# Patient Record
Sex: FEMALE | Race: WHITE
[De-identification: names, ages, dates, MRNs, and addresses within clinical notes are randomized per-mention and may not be internally consistent; named-entity substitution may affect disease eponyms.]

---

## 2020-09-11 ENCOUNTER — HOSPITAL ENCOUNTER (OUTPATIENT)
Dept: HOSPITAL 84 - D.ER | Age: 55
Setting detail: OBSERVATION
LOS: 1 days | Discharge: HOME | End: 2020-09-12
Attending: FAMILY MEDICINE | Admitting: FAMILY MEDICINE
Payer: COMMERCIAL

## 2020-09-11 VITALS — DIASTOLIC BLOOD PRESSURE: 90 MMHG | SYSTOLIC BLOOD PRESSURE: 129 MMHG

## 2020-09-11 VITALS — WEIGHT: 128.27 LBS | BODY MASS INDEX: 25.86 KG/M2 | HEIGHT: 59 IN

## 2020-09-11 VITALS — SYSTOLIC BLOOD PRESSURE: 136 MMHG | DIASTOLIC BLOOD PRESSURE: 68 MMHG

## 2020-09-11 VITALS — SYSTOLIC BLOOD PRESSURE: 139 MMHG | DIASTOLIC BLOOD PRESSURE: 77 MMHG

## 2020-09-11 VITALS — SYSTOLIC BLOOD PRESSURE: 145 MMHG | DIASTOLIC BLOOD PRESSURE: 80 MMHG

## 2020-09-11 VITALS — SYSTOLIC BLOOD PRESSURE: 129 MMHG | DIASTOLIC BLOOD PRESSURE: 90 MMHG

## 2020-09-11 DIAGNOSIS — F17.203: ICD-10-CM

## 2020-09-11 DIAGNOSIS — N39.0: ICD-10-CM

## 2020-09-11 DIAGNOSIS — E78.5: ICD-10-CM

## 2020-09-11 DIAGNOSIS — I10: ICD-10-CM

## 2020-09-11 DIAGNOSIS — G25.81: ICD-10-CM

## 2020-09-11 DIAGNOSIS — I20.0: Primary | ICD-10-CM

## 2020-09-11 LAB
ALBUMIN SERPL-MCNC: 3.7 G/DL (ref 3.4–5)
ALP SERPL-CCNC: 119 U/L (ref 30–120)
ALT SERPL-CCNC: 30 U/L (ref 10–68)
ANION GAP SERPL CALC-SCNC: 9.7 MMOL/L (ref 8–16)
APTT BLD: 28.6 SECONDS (ref 22.8–39.4)
BACTERIA #/AREA URNS HPF: (no result) /HPF
BASOPHILS NFR BLD AUTO: 0.3 % (ref 0–2)
BILIRUB SERPL-MCNC: 0.22 MG/DL (ref 0.2–1.3)
BILIRUB SERPL-MCNC: NEGATIVE MG/DL
BUN SERPL-MCNC: 11 MG/DL (ref 7–18)
CALCIUM SERPL-MCNC: 9.1 MG/DL (ref 8.5–10.1)
CHLORIDE SERPL-SCNC: 106 MMOL/L (ref 98–107)
CK MB SERPL-MCNC: 1.8 U/L (ref 0–3.6)
CK SERPL-CCNC: 85 UL (ref 21–215)
CO2 SERPL-SCNC: 29.1 MMOL/L (ref 21–32)
CREAT SERPL-MCNC: 0.8 MG/DL (ref 0.6–1.3)
EOSINOPHIL NFR BLD: 1.7 % (ref 0–7)
ERYTHROCYTE [DISTWIDTH] IN BLOOD BY AUTOMATED COUNT: 14 % (ref 11.5–14.5)
GLOBULIN SER-MCNC: 3 G/L
GLUCOSE SERPL-MCNC: 103 MG/DL (ref 74–106)
HCT VFR BLD CALC: 39.8 % (ref 36–48)
HGB BLD-MCNC: 13.2 G/DL (ref 12–16)
IMM GRANULOCYTES NFR BLD: 0.2 % (ref 0–5)
INR PPP: 0.87 (ref 0.85–1.17)
KETONES UR STRIP-MCNC: NEGATIVE MG/DL
LYMPHOCYTES NFR BLD AUTO: 39.8 % (ref 15–50)
MAGNESIUM SERPL-MCNC: 2 MG/DL (ref 1.8–2.4)
MCH RBC QN AUTO: 30.6 PG (ref 26–34)
MCHC RBC AUTO-ENTMCNC: 33.2 G/DL (ref 31–37)
MCV RBC: 92.3 FL (ref 80–100)
MONOCYTES NFR BLD: 5.6 % (ref 2–11)
NEUTROPHILS NFR BLD AUTO: 52.4 % (ref 40–80)
NITRITE UR-MCNC: POSITIVE MG/ML
OSMOLALITY SERPL CALC.SUM OF ELEC: 279 MOSM/KG (ref 275–300)
PH UR STRIP: 5 [PH] (ref 5–8)
PLATELET # BLD: 224 10X3/UL (ref 130–400)
PMV BLD AUTO: 10.2 FL (ref 7.4–10.4)
POTASSIUM SERPL-SCNC: 3.8 MMOL/L (ref 3.5–5.1)
PROT SERPL-MCNC: 6.7 G/DL (ref 6.4–8.2)
PROTHROMBIN TIME: 11.8 SECONDS (ref 11.6–15)
RBC # BLD AUTO: 4.31 10X6/UL (ref 4–5.4)
SODIUM SERPL-SCNC: 141 MMOL/L (ref 136–145)
SQUAMOUS #/AREA URNS HPF: (no result) /HPF (ref 0–5)
TROPONIN I SERPL-MCNC: < 0.017 NG/ML (ref 0–0.06)
UROBILINOGEN UR-MCNC: NORMAL MG/DL (ref ?–2)
WBC # BLD AUTO: 9.6 10X3/UL (ref 4.8–10.8)
WBC #/AREA URNS HPF: (no result) /HPF (ref 0–4)

## 2020-09-11 NOTE — CN
PATIENT NAME:BAKARI ALLRED                                 MEDICAL RECORD: [id number]
: 65                                              LOCATION:[facility] D.2115
ADMIT DATE: 20                                       ACCOUNT: [id number]
CONSULTING PHYSICIAN:    IZABELA BANEGAS MD          
                                               
REFERRING PHYSICIAN:     JAI NOBLES MD          
 
 
DATE OF CONSULTATION:  2020
 
HISTORY OF PRESENT ILLNESS:  A 55-year-old female with no known history of
coronary artery disease, history of hypertension, has history of dyslipidemia,
ongoing tobacco use, admitted with acute coronary syndrome, onset of episode
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
the jaw accompanied by left arm pain, strong family history of coronary artery
disease.  We are asked to see her concerning her cardiovascular status.
 
PAST MEDICAL HISTORY:  Includes;
1.  History of dyslipidemia.
2.  Peripheral neuropathy.
3.  Restless leg syndrome.
 
MEDICATIONS:  Include Flexeril 5 mg p.o. at bedtime, Lipitor 40 mg p.o. every
day, Neurontin 300 t.i.d., Klonopin 1 mg at bedtime, Requip 0.25 at bedtime,
Zoloft 100 mg p.o. day.
 
ALLERGIES:  SULFA, MORPHINE AND TRAMADOL.
 
SOCIAL HISTORY:  Works at a nursing home.  Smokes about a pack a day,
nondrinker.  Easily takes care of all her ADLs, still works full time.
 
REVIEW OF SYSTEMS:  The patient reports easy bruising but reports no swollen
glands. The patient reports no fever, no night sweats, no significant weight
gain, no significant weight loss.  No significant exercise tolerance.  The
patient reports no dry eyes, no irritation, no vision change.  Patient reports
no difficulty hearing and no ear pain.  Patient reports no frequent nose bleeds
or nose and sinus problems.  Patient reports on arm pain on exertion.  No
shortness of breath while lying down.  No history of heart murmur.  Patient
reports no cough, no wheezing or coughing up blood.  Patient reports no
abdominal pain, no vomiting.  Normal appetite.  No diarrhea and not vomiting
blood.  No nausea and no constipation.  Patient reports no incontinence.  No
difficulty urinating.  No hematuria.  No increased frequency.  Patient reports
no muscle aches.  No weakness, no arthralgias, no back pain.  No swelling of the
extremities.  Patient reports no abnormal mole, no jaundice, no rashes.  Reports
no loss of consciousness.  No weakness and no numbness.  No seizures, dizziness,
or headaches.  The patient reports no depression, no sleep disturbance, feeling
safe in a relationship and no alcohol abuse.  Patient reports on fatigue. 
Reports no runny nose or sinus pressure.  No itching, no hives, and no frequent
sneezing.
 
PHYSICAL EXAMINATION:
GENERAL:  No acute distress, appears stated age.
VITAL SIGNS:  Blood pressure 112/70, pulse 66 and regular.
HEENT:  Normocephalic, atraumatic.
NECK:  No bruits are noted.
HEART:  Regular.  A II/VI systolic ejection murmur.
LUNGS:  Slightly prolonged expiratory phase, few expiratory wheezes.
ABDOMEN:  Soft, nontender.
 
 
 
CONSULT REPORT                                 [id number]    BAKARI ALLRED               
 
 
EXTREMITIES:  Pulses 2+.  No edema.
 
IMPRESSION:  Acute coronary syndrome, multiple risk factors, rest symptomology.
 
PLAN:  For angiography, intervention based on the above.
 
TRANSINT:[id number] Voice Confirmation ID: [id number] DOCUMENT ID: [id number]
                                           
                                           IZABELA BANEGAS MD          
 
 
 
 
 
 
 
 
 
 
 
 
 
 
 
 
 
 
 
 
 
 
 
 
 
 
 
 
 
 
 
 
 
 
 
 
 
 
CC:                                                             [id number]
DICTATION DATE: 20 1027     :     20 1531      DIS IN  
                                                                      20
[place] [id number] [place] [id number]

## 2020-09-11 NOTE — HEMODYNAMI
PATIENT:BAKARI ALLRED                                    MEDICAL RECORD: [id number]
: 65                                            LOCATION:[facility]     D.2115
ACCT# [id number]                                       ADMISSION DATE: 20
 
 
 Generatedon:202010:55
Patient name: BAKARI ALLRED Patient #: [id number] Visit #: [id number] SSN: 
: 1965
Date of study: 2020
Page: Of
Hemodynamic Procedure Report
****************************
Patient Data
Patient Demographics
Procedure consent was obtained
First Name: BAKARI           Gender: Female
Last Name: BRIGIDA          : 1965
Patient #: [id number]       Age: 55 year(s)
Visit #: [id number]       Race: 
Accession #:
[id number]
Additional ID: [id number]
Contact details
Address: [place] [address]        Phone: [phone]
State: [place]
City: [place]
Zip code: [address]
Past Medical History
Allergies
Allergen     Reaction     Date      Comments
Reported
Other                     2020 SULFA/MORPHINE/TRAMADOL
allergy
Admission
Admission Data
Admission Date: 2020   Admission Time: 13:09
Room #: D.2115
Height (in.): 59.06         BSA: 1.53 (m2)
Height (cm.): 150           BMI: 25.78 (kg/m2)
Weight (lbs.): 127.87
Weight (kg.): 58
Lab Results
Lab Result Date: 2020  Lab Result Time: 0:00
Biochemistry
Name         Units    Result                Min      Max
BUN          mg/dl    14       --(--*-)--   7        18
Creatinine   mg/dl    0.7      --(*---)--   0.6      1.3
eGFR         ml/min   90       --(*---)--   90       120
NONAFRICAN
Troponin l   ng/ml    0.017    --(-*--)--   0        0.06
CBC
Name         Units    Result                Min      Max
Hematocrit   %        40.2     -*(----)--   42       54
Hemoglobin   g/dl     13.3     -*(----)--   13.5     17.5
Procedure
Procedure Types
Cath Procedure
Diagnostic Procedure
LHC
 
[facility] w/Coronaries
Procedure Description
Procedure Date
Procedure Date: 2020
Procedure Start Time: 10:43
Procedure End Time: 10:53
Procedure Staff
Name                            Function
Vinay Nelson MD              Performing Physician
Trang Wren RT               Monitor
Miguel Alvarez RN                  Nurse
Yvrose Martin RT              Scrub
Procedure Data
Cath Procedure
Fluoroscopy
Diagnostic fluoroscopy      Total fluoroscopy Time: 0.8
time: 0.8 min               min
Diagnostic fluoroscopy      Total fluoroscopy dose: 219
dose: 219 mGy               mGy
Contrast Material
Contrast Material Type                       Amount (ml)
Isovue 300                                   43
Entry Location
Entry     Primary  Successful  Side  Size  Upsize Upsize Entry    Closure Succes
sful  Closure
Location                             (Fr)  1 (Fr) 2 (Fr) Remarks  Device        
      Remarks
Femoral                        Right 5 Fr                         Exoseal
artery
Estimated blood loss: 5 ml
Diagnostic catheters
Device Type               Used For           End Catheter
Placement
MULTIPACK JL 4.0 5Fr      Procedure
catheter
MULTIPACK 3DRC 5Fr        Procedure
catheter
MULTIPACK Pigtail 5 Fr    Procedure
catheter
Procedure Complications
No complications
Procedure Medications
Medication           Administration Route Dosage
Oxygen               etCO2 Nasal cannula  2 l/min
Lidocaine 2%         added to field       20
Heparin Flush Bag    added to field       2 bags
(1000units/500ml NS)
0.9% NaCl            I.V.                 100 ml/hr
Versed               I.V.                 2 mg
Fentanyl             I.V.                 50 mcg
Versed               I.V.                 2 mg
Fentanyl             I.V.                 50 mcg
Hemodynamics
Rest
BSA: 1.53 (m2) HGB: 13.3 (g/dl) O2 Consumption: Estimated: 137.12 (ml/min) O2 Co
nsumption
indexed: Estimated:89.62 (ml/min/m) Heart Rate: 52 (bpm)
Pressure Samples
Time     Site     Value (mmHg) Purpose      Heart      Use
Rate(bpm)
 
10:48    LV       96/11,7      Snapshot     61
Snapshots
Pre Cath      Intra         NCS           Post Cath
Vital Signs
Time     Heart  Resp   SPO2 etCO2   NIBP       Rhythm  Pain Status Sedation
Rate   (ipm)  (%)  (mmHg)  (mmHg)                         Level
(bpm)
10:35:36 51     15     98   39.7    122/64(86) NSR     4 (11) ,    10(A)
Distressing
10:39:48 55     14     97   38.2    105/55(74) NSR     4 (11) ,    10(A)
Distressing
10:43:50 61     11     96   29.9    106/66(81) NSR     4 (11) ,    10(A)
Distressing
10:47:56 57     11     96   45.6    106/58(85) NSR     0 (11) , No 9(A)
pain
10:51:59 56     12     96   44.1    103/62(76) NSR     0 (11) , No 10(A)
pain
Medications
Time     Medication       Route   Dose  Verified Delivered Reason     Notes  Eff
ectiveness
by       by
10:39:03 Oxygen           etCO2   2     Vinay  Buffie    used for
Nasal   l/min [facility] Kade Alvarez RN   procedure
cannula       MD
10:39:14 Lidocaine 2%     added   20ml  Vinay  Vinay   for local
to      vial  [facility]   anesthetic
field MD VILLAFUERTE
10:39:21 Heparin Flush    added   2     Vinay  Vinay   used for
Bag              to      bags  [facility]   procedure
(1000units/500ml field         MD       MD
NS)
10:39:29 0.9% NaCl        I.V.    100   Vinay  Buffie    Per
ml/hr [place]  Alvarez RN   physician
MD
10:41:30 Versed           I.V.    2 mg  Vinay  Buffie    for
Leslie  Bell RN   sedation
MD
10:41:35 Fentanyl         I.V.    50    Vinay  Buffie    for
mcg   Leslie  Alvarez RN   sedation
MD
10:46:10 Versed           I.V.    2 mg  Vinay  Buffie    for
[place]  Bell RN   sedation
MD
10:46:13 Fentanyl         I.V.    50    Vinay  Buffie    for
mcg   [facility] RN   sedation
MD
Procedure Log
Time     Note
10:04:54 Informed consent obtained and on chart
10:05:45 Procedure Status Urgent Heart Cath (IP).
10:05:48 Time tracking: Call back (After hours or weekends)
10:17:42 Trang Wren RT(R) sent for patient. Start room use.
10:18:47 Patient allergic to Other
allergySULFA/MORPHINE/TRAMADOL
10:19:02 H&P Date Dictated: 2020 Within 30 days and on
chart., ER History on chart..
10:20:14 Lab Result : eGFR NONAFRICAN 90 ml/min
10:20:14 Lab Result : Hemoglobin 13.3 g/dl
10:20:14 Lab Result : Troponin l 0.017 ng/ml
10:20:14 Lab Result : BUN 14 mg/dl
 
10:20:14 Lab Result : Creatinine 0.7 mg/dl
10:20:14 Lab Result : Hematocrit 40.2 %
10:20:21 Patient Height : 59.06 inches
10:20:26 Patient Weight : 127.87 lbs
10:22:15 Plan of Care:Hemodynamics will remain stable., Cardiac
rhythm will remain stable., Comfort level will be
maintained., Respiratory function will remain
adequate., Patient/ family verbilizes understanding of
procedure., Procedure tolerated without complication.,
Recovers from procedure without complications..
10:26:47 Patient received from Med II to CCL 1 Alert and
oriented. Tansferred to table in Supine position.
10::48 Warm blankets applied, and esteban hugger turned on for
patient comfort.
10::49 Correct patient and procedure confirmed by team.
10::50 ECG and BP/O2 sat monitors applied to patient.
10:34:28 Vital chart was started
10:34:40 Baseline sample Acquired.
10::44 Rhythm: sinus bradycardia
10::44 Full Disclosure recording started
10:34:45 Pre-procedure instructions explained to patient.
10:34:45 Pre-op teaching completed and patient verbalized
understanding.
10:34:47 Family unavailable.
10:34:48 Patient NPO since Midnight.
10:34:49 Is the patient allergic to Iodine/contrast media? No.
10:34:51 Is patient on blood thinner?No
10:34:53 Patient diabetic? No.
10:35:30 Previous problem with sedation/anesthesia? No ?
10:35:32 Snore? Yes
10:35:33 Sleep apnea? No
10:35:34 Deviated septum? No
10:35:35 Opens mouth fully? Yes
10:35:37 Sticks out tongue? Yes
10:35:39 Airway obstruction? No ?
10:35:41 Dentures? No ?
10:35:44 Pre procedure: right dorsailis pedis pulse 1+
Palpable, but thready & weak; easily obliterated
10:35:49 Patient pain scale 4/10 ?.
10:36:16 IV patent on arrival in right wrist with 0.9% NaCl at
O.
10:36:18 Lab results completed and on chart.
10:36:23 Right groin area was prepped with chlora-prep and
draped in sterile fashion
10:36:23 Alarms reviewed by R. N.
10:36:24 Sharps counted by scrub and verified by R.N.
10:39:03 Oxygen 2 l/min etCO2 Nasal cannula was administered by
Miguel Alvarez RN; used for procedure; Verbal order read
back and verified.
10:39:14 Lidocaine 2% 20ml vial added to field was administered
by Vinay Nelson MD; for local anesthetic; Verbal
order read back and verified.
10:39:21 Heparin Flush Bag (1000units/500ml NS) 2 bags added to
field was administered by Vinay Nelson MD; used for
procedure; Verbal order read back and verified.
10:39:24 Use device set Femoral Dx
10:39:25 ACIST Syringe (58631) opened to sterile field.
10:39:25 Bag Decanter (2002S) opened to sterile field.
10:39:26 ACIST Hand Control (70938) opened to sterile field.
10:39:27 ACIST Manifold (16837) opened to sterile field.
 
10:39:27 Tegaderm 4 x 4 (1626W) opened to sterile field.
10:39:28 Medline Cath Pack ([id number]) opened to sterile field.
10:39:29 0.9% NaCl 100 ml/hr I.V. was administered by Miguel Alvarez RN; Per physician; Verbal order read back and
verified.
10:39:30 DIAGNOSTIC Multipack 5Fr catheter set (CM2321) opened
to sterile field.
10:39:31 SHEATH 5FR [place] (EEX226) opened to sterile field.
10:39:32 EMERALD Guide Wire (299-267) opened to sterile field.
10:40:41 --------ALL STOP TIME OUT------
10:40:42 Final Timeout: patient, procedure, and site verified
with staff and physician. All members of the team are
in agreement.
10:40:43 Right groin site verified by team.
10:40:47 Fire Safety Assessment: A--An alcohol-based skin
anteseptic being used preoperatively., C--Open oxygen
or nitrous oxide is being used., D--An ESU, laser, or
fiber-optic light is being used.
10:40:49 Physical assessment completed. ASA score P 2 - A
patient with mild systemic disease as per Vinay Nelson MD.
10:40:51 1) 90+ Normal kidney functon but urine findings or
structural abnormalities or genetic trait point to
kidney disease.
10:40:53 Maximum allowable contrast dose (3.7 X eGFR X 0.75)250
ml.
10:40:56 Sedation plan: IV Moderate Sedation Medication:Versed,
Fentanyl
10:41:23 Zero performed for pressure channel P1
10:41:30 Versed 2 mg I.V. was administered by Miguel Alvarez RN;
for sedation; Verbal order read back and verified.
10:41:35 Fentanyl 50 mcg I.V. was administered by Miguel Alvarez
RN; for sedation; Verbal order read back and verified.
10:42:18 Procedure started.
10:43:13 Local anesthetic to right femoral artery with
Lidocaine 2% by Vinay Nelson MD.**INITIAL ACCESS
ONLY**
10:44:11 A 5 Fr sheath was inserted into the Right Femoral
artery
10:44:41 A MULTIPACK JL 4.0 5Fr catheter was advanced over the
wire and used for Procedure.
10:46:04 LCA angiography performed.
10:46:08 Catheter removed.
10:46:10 Versed 2 mg I.V. was administered by Miguel Alvarez RN;
for sedation; Verbal order read back and verified.
10:46:13 Fentanyl 50 mcg I.V. was administered by Miguel Alvarez
RN; for sedation; Verbal order read back and verified.
10:46:13 A MULTIPACK 3DRC 5Fr catheter was advanced over the
wire and used for Procedure.
10:47:13 RCA angiography performed.
10:47:14 Catheter removed.
10:47:20 A MULTIPACK Pigtail 5 Fr catheter was advanced over
the wire and used for Procedure.
10:48:21 LV gram done using TEE
10:48:23 Injector settings: Ml/sec: 10, Volume: 20,
10:48:54 LV hemodynamics recorded.
10:48:58 EF : 55 %
10:49:09 Catheter removed.
10:49:17 EXOSEAL 5Fr () opened to sterile field.
10:50:05 Sheath removed intact; hemostasis achieved with
 
Exoseal to the Right Femoral artery.
10:50:06 Procedure ended.(Physican Out)
10:50:16 Fluoroscopy time 00.80 minutes.
10:50:20 Fluoroscopy dose: 219 mGy
10:50:20 Flurop Dose total: 219
10:50:30 Dose Area Product 9800 mGy/cm.
10:50:34 Contrast amount:Isovue 300 43ml.
10:50:36 Maximum allowable dose exceeded? No.
10:50:37 Sharps counted by scrub and verified by R.N.
10:50:41 Post-op/insertion site Right Femoral artery dressed
using a 4 x 4 and Tegaderm.
10:50:44 Post-procedure physical assessment completed. ASA
score P 2 - A patient with mild systemic disease as
per Vinay Nelson MD.
10:50:48 Post procedure rhythm: sinus bradycardia
10:50:51 Estimated blood loss: 5 ml
10:50:52 Post procedure instruction explained to
patient.Patient verbalizes understanding.
10:50:52 Patient needs reinforcement of post procedure
teaching.
10:51:17 Procedure and supply charges have been captured,
reviewed, submitted and are correct.
10:51:20 Procedure Complication : No complications
10:51:24 [facility] Findings: mild to moderate CAD (<70%)
10:51:27 Operative report dictated upon procedure completion.
10:51:27 See physician's report for complete and final results.
10:53:03 Vital chart was stopped
10:53:06 Report given to [facility].
10:53:08 Patient transfered to [facility] with Bed.
10:53:23 Procedure ended.
10:53:23 Full Disclosure recording stopped
10:53:27 End room use (Document Last)
10:53:54 End room use (Document Last)
10:54:20 End room use (Document Last)
Device Usage
Item Name   Manufacture  Quantity  Catalog    Hospital Part    Current Minimal L
ot# /
Number     Charge   Number  Stock   Stock   Serial#
Code
ACIST       Acist        1         73147      [id number]   [id number]  [id number]  20
Syringe     Medical
(60513)     Systems Inc
Bag         Microtek     1               219681   42715   584919  5
Decanter    Medical Inc.
()
ACIST Hand  Acist        1         25333      993544   248615  061533  5
Control     Medical
(11924)     Systems Inc
ACIST       Acist        1         44638      328949   483172  447862  5
Manifold    Medical
(45287)     Systems Inc
Tegaderm 4  3M           1         1626W      466657   976378  490334  5
x 4 (1626W)
Medline     Medline      1         [id number]  587791   21552   889220  5
Cath Pack
([id number])
DIAGNOSTIC  Cardinal     1         WR8726     308140   30614   961300  30
RipCode
5Fr
catheter
 
set
([id number])
SHEATH 5FR  Terumo       1         [id number]     680027   669939  227530  5
[place]
(ZHP690)
[facility]     Cardinal     1         729-725    467124   294870  873360  5
Guide Wire  Health
(369-113)
MULTIPACK   Cardinal     1                                     049533  5
JL 4.0 5Fr  Health
catheter
MULTIPACK   Cardinal     1                                     356218  5
3DRC 5Fr    Health
catheter
MULTIPACK   Cardinal     1                                     691238  5
Pigtail 5   Health
Fr catheter
EXOSEAL 5Fr Cardinal     1               374490   066645  863990  10
()     Health
Signature Audit [place]
Stage           Time        Signature      Unsigned
Intra-Procedure 2020   Trang Wren
10:53:54 AM RT(R)
Intra-Procedure 2020   Miguel Alvarez RN
10:54:20 AM
Intra-Procedure 2020   Vinay Jaimes
10:55:46 AM Kade VILLAFUERTE
 
 
 
 
 
 
 
 
 
 
 
 
 
 
 
 
 
 
 
 
 
 
 
 
 
 
 
 
[facility]                                          
[address] [place] [address]

## 2020-09-11 NOTE — NUR
INITIAL ROUNDS AND ASSESSMENT COMPLETED. PT RESTING. NO DISTRESS. SR PER
TELEMETRY. HAS BEEN AMBULATORY IN THE HALLWAY. NONLABORED RESPIRATIONS ON ROOM
AIR. SALINE LOCK TO LFA. CPOC.

## 2020-09-12 VITALS — SYSTOLIC BLOOD PRESSURE: 142 MMHG | DIASTOLIC BLOOD PRESSURE: 74 MMHG

## 2020-09-12 VITALS — DIASTOLIC BLOOD PRESSURE: 67 MMHG | SYSTOLIC BLOOD PRESSURE: 126 MMHG

## 2020-09-12 VITALS — DIASTOLIC BLOOD PRESSURE: 70 MMHG | SYSTOLIC BLOOD PRESSURE: 112 MMHG

## 2020-09-12 LAB
ALBUMIN SERPL-MCNC: 3.5 G/DL (ref 3.4–5)
ALP SERPL-CCNC: 115 U/L (ref 30–120)
ALT SERPL-CCNC: 28 U/L (ref 10–68)
ANION GAP SERPL CALC-SCNC: 8.9 MMOL/L (ref 8–16)
BASOPHILS NFR BLD AUTO: 1 % (ref 0–2)
BILIRUB SERPL-MCNC: 0.17 MG/DL (ref 0.2–1.3)
BUN SERPL-MCNC: 14 MG/DL (ref 7–18)
CALCIUM SERPL-MCNC: 8.6 MG/DL (ref 8.5–10.1)
CHLORIDE SERPL-SCNC: 106 MMOL/L (ref 98–107)
CO2 SERPL-SCNC: 30.1 MMOL/L (ref 21–32)
CREAT SERPL-MCNC: 0.7 MG/DL (ref 0.6–1.3)
EOSINOPHIL NFR BLD: 4 % (ref 0–7)
ERYTHROCYTE [DISTWIDTH] IN BLOOD BY AUTOMATED COUNT: 13.9 % (ref 11.5–14.5)
GLOBULIN SER-MCNC: 2.8 G/L
GLUCOSE SERPL-MCNC: 99 MG/DL (ref 74–106)
HCT VFR BLD CALC: 40.2 % (ref 36–48)
HGB BLD-MCNC: 13.3 G/DL (ref 12–16)
LYMPHOCYTES NFR BLD AUTO: 57 % (ref 15–50)
MAGNESIUM SERPL-MCNC: 2.1 MG/DL (ref 1.8–2.4)
MCH RBC QN AUTO: 30.8 PG (ref 26–34)
MCHC RBC AUTO-ENTMCNC: 33.1 G/DL (ref 31–37)
MCV RBC: 93.1 FL (ref 80–100)
MONOCYTES NFR BLD: 8 % (ref 2–11)
NEUTROPHILS NFR BLD AUTO: 30 % (ref 40–80)
OSMOLALITY SERPL CALC.SUM OF ELEC: 281 MOSM/KG (ref 275–300)
PLATELET # BLD EST: NORMAL 10*3/UL
PLATELET # BLD: 210 10X3/UL (ref 130–400)
PMV BLD AUTO: 10 FL (ref 7.4–10.4)
POTASSIUM SERPL-SCNC: 4 MMOL/L (ref 3.5–5.1)
PROT SERPL-MCNC: 6.3 G/DL (ref 6.4–8.2)
RBC # BLD AUTO: 4.32 10X6/UL (ref 4–5.4)
SODIUM SERPL-SCNC: 141 MMOL/L (ref 136–145)
TROPONIN I SERPL-MCNC: < 0.017 NG/ML (ref 0–0.06)
WBC # BLD AUTO: 8.2 10X3/UL (ref 4.8–10.8)

## 2020-09-12 NOTE — NUR
NO CHANGES TO RT GROIN. PT RESTING COMFORTABLY IN BED, DENIES ANY NEEDS AT
THIS TIME. CALL LIGHT IN REACH, NAD NOTED, WILL CONTINUE TO MONITOR.

## 2020-09-12 NOTE — NUR
PROVIDED VERBAL AND WRITTEN DISCHARGE TEACHING TO PT, WHO VERBALIZED
UNDERSTANDING REGARDING TEACHING. D/C RT FA AND LT FA IV WITH CATHETER TIPS
INTACT. HEART MONITOR REMOVED AND TAKEN TO MONITOR TECH. NO CHANGES TO RT
GROIN DRESSING, NO S/S OF BLEEDING OR HEMATOMA NOTED. PT WAITING ON RIDE, WILL
NOTIFY NURSE OR CNA WHEN READY FOR WHEELCHAIR.

## 2020-09-12 NOTE — NUR
RECEIVED PT BACK FROM CATH LAB. PT DROWSY BUT EASILY AROUSES TO VOICE. PUT ON
2L NC. VITAL SIGNS STABLE, PLACED ON CONT VITAL SIGNS. RT GROIN DRESSING CDI,
NO S/S OF BLEEDING OR HEMATOMA. INSTRUCTED PT TO LAY FLAT FOR 2 HHRS. PT
DENIES ANY NEEDS AT THIS TIME. CALL LIGHT IN REACH,NAD NOTED, WILL CONTINUE TO
MONITOR.